# Patient Record
Sex: MALE | Race: WHITE | NOT HISPANIC OR LATINO | URBAN - NONMETROPOLITAN AREA
[De-identification: names, ages, dates, MRNs, and addresses within clinical notes are randomized per-mention and may not be internally consistent; named-entity substitution may affect disease eponyms.]

---

## 2017-08-14 ENCOUNTER — OFFICE VISIT (OUTPATIENT)
Dept: FAMILY MEDICINE CLINIC | Facility: CLINIC | Age: 57
End: 2017-08-14

## 2017-08-14 VITALS
SYSTOLIC BLOOD PRESSURE: 128 MMHG | HEIGHT: 66 IN | DIASTOLIC BLOOD PRESSURE: 76 MMHG | WEIGHT: 173 LBS | BODY MASS INDEX: 27.8 KG/M2 | HEART RATE: 74 BPM

## 2017-08-14 DIAGNOSIS — Z02.89 ENCOUNTER FOR EXAMINATION REQUIRED BY DEPARTMENT OF TRANSPORTATION (DOT): Primary | ICD-10-CM

## 2017-08-14 PROCEDURE — DOTPHY: Performed by: INTERNAL MEDICINE

## 2017-08-14 NOTE — PROGRESS NOTES
Berto Soni is a 56 y.o. male who presents to the office for a DOT Exam. See Federal DOT examination form for details of this visit.

## 2017-08-14 NOTE — PATIENT INSTRUCTIONS
